# Patient Record
Sex: MALE | Race: WHITE | Employment: FULL TIME | URBAN - METROPOLITAN AREA
[De-identification: names, ages, dates, MRNs, and addresses within clinical notes are randomized per-mention and may not be internally consistent; named-entity substitution may affect disease eponyms.]

---

## 2017-01-10 ENCOUNTER — OFFICE VISIT (OUTPATIENT)
Dept: FAMILY MEDICINE | Facility: CLINIC | Age: 37
End: 2017-01-10
Payer: COMMERCIAL

## 2017-01-10 VITALS
HEART RATE: 72 BPM | WEIGHT: 214.1 LBS | HEIGHT: 67 IN | DIASTOLIC BLOOD PRESSURE: 70 MMHG | BODY MASS INDEX: 33.6 KG/M2 | SYSTOLIC BLOOD PRESSURE: 112 MMHG

## 2017-01-10 DIAGNOSIS — M79.18 MYOFASCIAL PAIN ON LEFT SIDE: ICD-10-CM

## 2017-01-10 DIAGNOSIS — R53.83 OTHER FATIGUE: ICD-10-CM

## 2017-01-10 DIAGNOSIS — F51.01 PRIMARY INSOMNIA: ICD-10-CM

## 2017-01-10 DIAGNOSIS — Z13.6 CARDIOVASCULAR SCREENING; LDL GOAL LESS THAN 160: ICD-10-CM

## 2017-01-10 DIAGNOSIS — Z00.00 ROUTINE GENERAL MEDICAL EXAMINATION AT A HEALTH CARE FACILITY: Primary | ICD-10-CM

## 2017-01-10 LAB
ANION GAP SERPL CALCULATED.3IONS-SCNC: 9 MMOL/L (ref 3–14)
BASOPHILS # BLD AUTO: 0 10E9/L (ref 0–0.2)
BASOPHILS NFR BLD AUTO: 0.3 %
BUN SERPL-MCNC: 17 MG/DL (ref 7–30)
CALCIUM SERPL-MCNC: 8.3 MG/DL (ref 8.5–10.1)
CHLORIDE SERPL-SCNC: 104 MMOL/L (ref 94–109)
CHOLEST SERPL-MCNC: 204 MG/DL
CO2 SERPL-SCNC: 25 MMOL/L (ref 20–32)
CREAT SERPL-MCNC: 0.96 MG/DL (ref 0.66–1.25)
DIFFERENTIAL METHOD BLD: NORMAL
EOSINOPHIL # BLD AUTO: 0.3 10E9/L (ref 0–0.7)
EOSINOPHIL NFR BLD AUTO: 4 %
ERYTHROCYTE [DISTWIDTH] IN BLOOD BY AUTOMATED COUNT: 13.4 % (ref 10–15)
GFR SERPL CREATININE-BSD FRML MDRD: 89 ML/MIN/1.7M2
GLUCOSE SERPL-MCNC: 94 MG/DL (ref 70–99)
HCT VFR BLD AUTO: 44.1 % (ref 40–53)
HDLC SERPL-MCNC: 48 MG/DL
HGB BLD-MCNC: 15.2 G/DL (ref 13.3–17.7)
LDLC SERPL CALC-MCNC: 140 MG/DL
LYMPHOCYTES # BLD AUTO: 1.7 10E9/L (ref 0.8–5.3)
LYMPHOCYTES NFR BLD AUTO: 25.8 %
MCH RBC QN AUTO: 29.5 PG (ref 26.5–33)
MCHC RBC AUTO-ENTMCNC: 34.5 G/DL (ref 31.5–36.5)
MCV RBC AUTO: 86 FL (ref 78–100)
MONOCYTES # BLD AUTO: 0.6 10E9/L (ref 0–1.3)
MONOCYTES NFR BLD AUTO: 9.6 %
NEUTROPHILS # BLD AUTO: 4 10E9/L (ref 1.6–8.3)
NEUTROPHILS NFR BLD AUTO: 60.3 %
NONHDLC SERPL-MCNC: 156 MG/DL
PLATELET # BLD AUTO: 227 10E9/L (ref 150–450)
POTASSIUM SERPL-SCNC: 3.6 MMOL/L (ref 3.4–5.3)
RBC # BLD AUTO: 5.16 10E12/L (ref 4.4–5.9)
SODIUM SERPL-SCNC: 138 MMOL/L (ref 133–144)
TRIGL SERPL-MCNC: 78 MG/DL
TSH SERPL DL<=0.005 MIU/L-ACNC: 1.85 MU/L (ref 0.4–4)
WBC # BLD AUTO: 6.6 10E9/L (ref 4–11)

## 2017-01-10 PROCEDURE — 36415 COLL VENOUS BLD VENIPUNCTURE: CPT | Performed by: FAMILY MEDICINE

## 2017-01-10 PROCEDURE — 84443 ASSAY THYROID STIM HORMONE: CPT | Performed by: FAMILY MEDICINE

## 2017-01-10 PROCEDURE — 85025 COMPLETE CBC W/AUTO DIFF WBC: CPT | Performed by: FAMILY MEDICINE

## 2017-01-10 PROCEDURE — 80061 LIPID PANEL: CPT | Performed by: FAMILY MEDICINE

## 2017-01-10 PROCEDURE — 80048 BASIC METABOLIC PNL TOTAL CA: CPT | Performed by: FAMILY MEDICINE

## 2017-01-10 PROCEDURE — 99213 OFFICE O/P EST LOW 20 MIN: CPT | Mod: 25 | Performed by: FAMILY MEDICINE

## 2017-01-10 PROCEDURE — 99395 PREV VISIT EST AGE 18-39: CPT | Performed by: FAMILY MEDICINE

## 2017-01-10 RX ORDER — TRAZODONE HYDROCHLORIDE 50 MG/1
50 TABLET, FILM COATED ORAL AT BEDTIME
Qty: 30 TABLET | Refills: 5 | Status: SHIPPED | OUTPATIENT
Start: 2017-01-10 | End: 2017-06-06

## 2017-01-10 NOTE — MR AVS SNAPSHOT
After Visit Summary   1/10/2017    Duane Rm    MRN: [id number]           Patient Information     Date Of Birth          1980        Visit Information        Provider Department      1/10/2017 9:20 AM TAMMI Vogel MD [facility]        Today's Diagnoses     CARDIOVASCULAR SCREENING; LDL GOAL LESS THAN 160    -  1     Other fatigue         Primary insomnia           Care Instructions    Try capsaicin cream, applied sparingly to the sore area left upper back using rubber gloves up to 4 times a day.  If the trazodone makes you too groggy the next day, take 1/2 a pill.        Preventive Health Recommendations  Male Ages 26 - 39    Yearly exam:             See your health care provider every year in order to  o   Review health changes.   o   Discuss preventive care.    o   Review your medicines if your doctor has prescribed any.    You should be tested each year for STDs (sexually transmitted diseases), if you re at risk.     After age 35, talk to your provider about cholesterol testing. If you are at risk for heart disease, have your cholesterol tested at least every 5 years.     If you are at risk for diabetes, you should have a diabetes test (fasting glucose).  Shots: Get a flu shot each year. Get a tetanus shot every 10 years.     Nutrition:    Eat at least 5 servings of fruits and vegetables daily.     Eat whole-grain bread, whole-wheat pasta and brown rice instead of white grains and rice.     Talk to your provider about Calcium and Vitamin D.     Lifestyle    Exercise for at least 150 minutes a week (30 minutes a day, 5 days a week). This will help you control your weight and prevent disease.     Limit alcohol to one drink per day.     No smoking.     Wear sunscreen to prevent skin cancer.     See your dentist every six months for an exam and cleaning.             Follow-ups after your visit        Who to contact     If you have questions or need follow up information about  "today's clinic visit or your schedule please contact [facility] directly at [phone].  Normal or non-critical lab and imaging results will be communicated to you by [employer] Nowhart, letter or phone within 4 business days after the clinic has received the results. If you do not hear from us within 7 days, please contact the clinic through [employer] Nowhart or phone. If you have a critical or abnormal lab result, we will notify you by phone as soon as possible.  Submit refill requests through FaceTags or call your pharmacy and they will forward the refill request to us. Please allow 3 business days for your refill to be completed.          Additional Information About Your Visit        Imm[employer] Nowhart Information     FaceTags lets you send messages to your doctor, view your test results, renew your prescriptions, schedule appointments and more. To sign up, go to [email or web address] . Click on \"Log in\" on the left side of the screen, which will take you to the Welcome page. Then click on \"Sign up Now\" on the right side of the page.     You will be asked to enter the access code listed below, as well as some personal information. Please follow the directions to create your username and password.     Your access code is: [email or web address]  Expires: 4/10/2017  9:58 AM     Your access code will  in 90 days. If you need help or a new code, please call your [place] clinic or [phone].        Care EveryWhere ID     This is your Care EveryWhere ID. This could be used by other organizations to access your [facility] medical records  [id number]        Your Vitals Were     Pulse Height BMI (Body Mass Index)             72 5' 7\" (1.702 m) 33.52 kg/m2          Blood Pressure from Last 3 Encounters:   01/10/17 112/70   12/31/15 120/69   12 128/70    Weight from Last 3 Encounters:   01/10/17 214 lb 1.6 oz (97.115 kg)   12/31/15 216 lb 3.2 oz (98.068 kg)   12 205 lb (92.987 kg)              We Performed the Following  "    Basic metabolic panel     CBC with platelets differential     Lipid panel reflex to direct LDL     TSH with free T4 reflex          Today's Medication Changes          These changes are accurate as of: 1/10/17  9:58 AM.  If you have any questions, ask your nurse or doctor.               Start taking these medicines.        Dose/Directions    traZODone 50 MG tablet   Commonly known as:  DESYREL   Used for:  Primary insomnia        Dose:  50 mg   Take 1 tablet (50 mg) by mouth At Bedtime   Quantity:  30 tablet   Refills:  5            Where to get your medicines      These medications were sent to [facility] [address] [address] [address]     Phone:  [phone]    - traZODone 50 MG tablet             Primary Care Provider Office Phone # Fax #    R Cleve Vogel [phone]       [facility] [address] [place] [address]        Thank you!     Thank you for choosing [facility]  for your care. Our goal is always to provide you with excellent care. Hearing back from our patients is one way we can continue to improve our services. Please take a few minutes to complete the written survey that you may receive in the mail after your visit with us. Thank you!             Your Updated Medication List - Protect others around you: Learn how to safely use, store and throw away your medicines at www.disposemymeds.org.          This list is accurate as of: 1/10/17  9:58 AM.  Always use your most recent med list.                   Brand Name Dispense Instructions for use    NO ACTIVE MEDICATIONS          traZODone 50 MG tablet    DESYREL    30 tablet    Take 1 tablet (50 mg) by mouth At Bedtime

## 2017-01-10 NOTE — PROGRESS NOTES
SUBJECTIVE:     CC: Duane Rm is an 36 year old male who presents for preventative health visit.     Healthy Habits:    Do you get at least three servings of calcium containing foods daily (dairy, green leafy vegetables, etc.)? yes    Amount of exercise or daily activities, outside of work: 3-4 day(s) per week    Problems taking medications regularly not applicable    Medication side effects: No    Have you had an eye exam in the past two years? yes    Do you see a dentist twice per year? no    Do you have sleep apnea, excessive snoring or daytime drowsiness? Yes, pt. States he is always tired      Insomnia: He has been bothered for years with difficulty falling asleep and also waking up after a few hours and having difficulty going back to sleep. He does feel tired and run down during the day. He states that he is not depressed or down the dumps and does not really feel it's anxiety either. Once he does wake up he starts thinking about many things and has difficulty unwinding again.    He also complains of pain in the left shoulder strap region that comes and goes      Today's PHQ-2 Score:   PHQ-2 ( 1999 Pfizer) 1/10/2017   Q1: Little interest or pleasure in doing things 0   Q2: Feeling down, depressed or hopeless 0   PHQ-2 Score 0       Abuse: Current or Past(Physical, Sexual or Emotional)- No  Do you feel safe in your environment - Yes    Social History   Substance Use Topics     Smoking status: Current Some Day Smoker -- 15.0 years     Types: Cigarettes     Smokeless tobacco: Current User     Types: Chew      Comment: has 1-2 per month     Alcohol Use: Yes      Comment: once a week has 8-10 beers     The patient does not drink >3 drinks per day nor >7 drinks per week.    Last PSA: No results found for: PSA    No results for input(s): CHOL, HDL, LDL, TRIG, CHOLHDLRATIO, NHDL in the last 86470 hours.    Reviewed orders with patient. Reviewed health maintenance and updated orders accordingly - Yes    All  "Histories reviewed and updated in Epic.      ROS:  C: NEGATIVE for fever, chills, change in weight  I: NEGATIVE for worrisome rashes, moles or lesions  E: NEGATIVE for vision changes or irritation  ENT: NEGATIVE for ear, mouth and throat problems  R: NEGATIVE for significant cough or SOB  CV: NEGATIVE for chest pain, palpitations or peripheral edema  GI: NEGATIVE for nausea, abdominal pain, heartburn, or change in bowel habits   male: negative for dysuria, hematuria, decreased urinary stream, erectile dysfunction, urethral discharge  M: See above, otherwise negative  N: NEGATIVE for weakness, dizziness or paresthesias  P: NEGATIVE for changes in mood or affect      OBJECTIVE:     /70 mmHg  Pulse 72  Ht 5' 7\" (1.702 m)  Wt 214 lb 1.6 oz (97.115 kg)  BMI 33.52 kg/m2  EXAM:  GENERAL: healthy, alert and no distress  EYES: Eyes grossly normal to inspection, PERRL and conjunctivae and sclerae normal  HENT: ear canals and TM's normal, nose and mouth without ulcers or lesions  NECK: no adenopathy, no asymmetry, masses, or scars and thyroid normal to palpation  RESP: lungs clear to auscultation - no rales, rhonchi or wheezes  CV: regular rate and rhythm, normal S1 S2, no S3 or S4, no murmur, click or rub, no peripheral edema and peripheral pulses strong  ABDOMEN: soft, nontender, no hepatosplenomegaly, no masses and bowel sounds normal  MS: Tenderness to palpation of the left shoulder strap region; the shoulder joint is normal no gross musculoskeletal defects noted, no edema  SKIN: no suspicious lesions or rashes  NEURO: Normal strength and tone, mentation intact and speech normal  PSYCH: mentation appears normal, affect normal/bright    ASSESSMENT/PLAN:       ASSESSMENT:  1. Routine general medical examination at a health care facility    2. Other fatigue    3. Primary insomnia    4. CARDIOVASCULAR SCREENING; LDL GOAL LESS THAN 160    5. Myofascial pain on left side      Discussed pathophysiology of insomnia and " implications.  Questions answered.  We discussed sleep hygiene with recommendations to get up out of bed and go in another room to read or watch TV until he gets drowsy. Will do some workup for other causes of fatigue other than the sleep disturbance.    PLAN:  Orders Placed This Encounter     OFFICE/OUTPT VISIT,EST,LEVL II     Basic metabolic panel     Lipid panel reflex to direct LDL     TSH with free T4 reflex     CBC with platelets differential     traZODone (DESYREL) 50 MG tablet   Discussed how to take the medication(s), expected outcomes, potential side effects.     Patient Instructions   Try capsaicin cream, applied sparingly to the sore area left upper back using rubber gloves up to 4 times a day.  If the trazodone makes you too groggy the next day, take 1/2 a pill.        Preventive Health Recommendations  Male Ages 26 - 39    Yearly exam:             See your health care provider every year in order to  o   Review health changes.   o   Discuss preventive care.    o   Review your medicines if your doctor has prescribed any.    You should be tested each year for STDs (sexually transmitted diseases), if you re at risk.     After age 35, talk to your provider about cholesterol testing. If you are at risk for heart disease, have your cholesterol tested at least every 5 years.     If you are at risk for diabetes, you should have a diabetes test (fasting glucose).  Shots: Get a flu shot each year. Get a tetanus shot every 10 years.     Nutrition:    Eat at least 5 servings of fruits and vegetables daily.     Eat whole-grain bread, whole-wheat pasta and brown rice instead of white grains and rice.     Talk to your provider about Calcium and Vitamin D.     Lifestyle    Exercise for at least 150 minutes a week (30 minutes a day, 5 days a week). This will help you control your weight and prevent disease.     Limit alcohol to one drink per day.     No smoking.     Wear sunscreen to prevent skin cancer.     See your  "dentist every six months for an exam and cleaning.            COUNSELING:  Reviewed preventive health counseling, as reflected in patient instructions       Regular exercise       Healthy diet/nutrition       Vision screening       Hearing screening         reports that he has been smoking Cigarettes.  He has smoked for the past 15.0 years. His smokeless tobacco use includes Chew.  Tobacco Cessation Action Plan: Information offered: Patient not interested at this time  Estimated body mass index is 33.52 kg/(m^2) as calculated from the following:    Height as of this encounter: 5' 7\" (1.702 m).    Weight as of this encounter: 214 lb 1.6 oz (97.115 kg).   Weight management plan: Discussed healthy diet and exercise guidelines and patient will follow up in 12 months in clinic to re-evaluate.    Counseling Resources:  ATP IV Guidelines  Pooled Cohorts Equation Calculator  FRAX Risk Assessment  ICSI Preventive Guidelines  Dietary Guidelines for Americans, 2010  USDA's MyPlate  ASA Prophylaxis  Lung CA Screening    TAMMI Vogel MD  [facility]  "

## 2017-01-10 NOTE — Clinical Note
[facility]  [address] [facility] [place] [address]  Phone: [phone]    January 11, 2017    Duane Rm  [address] [address] [address]          Dear Duane,    The results of your recent lab tests were within normal limits. There is nothing unusual in your lab tests that would explain the fatigue that you have. Their cholesterol levels were a little higher than ideal, so I would recommend trying to cut down on the fat in your diet, increasing aerobic exercise, and losing some weight. Recheck your cholesterol levels in one year. Enclosed is a copy of these results.  If you have any further questions or problems, please contact our office.  Results for orders placed or performed in visit on 01/10/17   Basic metabolic panel   Result Value Ref Range    Sodium 138 133 - 144 mmol/L    Potassium 3.6 3.4 - 5.3 mmol/L    Chloride 104 94 - 109 mmol/L    Carbon Dioxide 25 20 - 32 mmol/L    Anion Gap 9 3 - 14 mmol/L    Glucose 94 70 - 99 mg/dL    Urea Nitrogen 17 7 - 30 mg/dL    Creatinine 0.96 0.66 - 1.25 mg/dL    GFR Estimate 89 >60 mL/min/1.7m2    GFR Estimate If Black >90   GFR Calc   >60 mL/min/1.7m2    Calcium 8.3 (L) 8.5 - 10.1 mg/dL   Lipid panel reflex to direct LDL   Result Value Ref Range    Cholesterol 204 (H) <200 mg/dL    Triglycerides 78 <150 mg/dL    HDL Cholesterol 48 >39 mg/dL    LDL Cholesterol Calculated 140 (H) <100 mg/dL    Non HDL Cholesterol 156 (H) <130 mg/dL   TSH with free T4 reflex   Result Value Ref Range    TSH 1.85 0.40 - 4.00 mU/L   CBC with platelets differential   Result Value Ref Range    WBC 6.6 4.0 - 11.0 10e9/L    RBC Count 5.16 4.4 - 5.9 10e12/L    Hemoglobin 15.2 13.3 - 17.7 g/dL    Hematocrit 44.1 40.0 - 53.0 %    MCV 86 78 - 100 fl    MCH 29.5 26.5 - 33.0 pg    MCHC 34.5 31.5 - 36.5 g/dL    RDW 13.4 10.0 - 15.0 %    Platelet Count 227 150 - 450 10e9/L    Diff Method Automated Method     % Neutrophils 60.3 %    % Lymphocytes  25.8 %    % Monocytes 9.6 %    % Eosinophils 4.0 %    % Basophils 0.3 %    Absolute Neutrophil 4.0 1.6 - 8.3 10e9/L    Absolute Lymphocytes 1.7 0.8 - 5.3 10e9/L    Absolute Monocytes 0.6 0.0 - 1.3 10e9/L    Absolute Eosinophils 0.3 0.0 - 0.7 10e9/L    Absolute Basophils 0.0 0.0 - 0.2 10e9/L     Sincerely,      ELIJAH Vogel MD/ llc

## 2017-01-10 NOTE — NURSING NOTE
"Chief Complaint   Patient presents with     Physical     pt. is fasting for labwork       Initial /70 mmHg  Pulse 72  Ht 5' 7\" (1.702 m)  Wt 214 lb 1.6 oz (97.115 kg)  BMI 33.52 kg/m2 Estimated body mass index is 33.52 kg/(m^2) as calculated from the following:    Height as of this encounter: 5' 7\" (1.702 m).    Weight as of this encounter: 214 lb 1.6 oz (97.115 kg).  BP completed using cuff size: marvin Regan, JUANI      "

## 2017-01-10 NOTE — PROGRESS NOTES
Quick Note:    Please SEND LETTER    Notify patient of acceptable results. There is nothing unusual in your lab tests that would explain the fatigue that you have. Their cholesterol levels were a little higher than ideal, so I would recommend trying to cut down on the fat in your diet, increasing aerobic exercise, and losing some weight. Recheck your cholesterol levels in one year        ______

## 2017-01-10 NOTE — PATIENT INSTRUCTIONS
Try capsaicin cream, applied sparingly to the sore area left upper back using rubber gloves up to 4 times a day.  If the trazodone makes you too groggy the next day, take 1/2 a pill.        Preventive Health Recommendations  Male Ages 26 - 39    Yearly exam:             See your health care provider every year in order to  o   Review health changes.   o   Discuss preventive care.    o   Review your medicines if your doctor has prescribed any.    You should be tested each year for STDs (sexually transmitted diseases), if you re at risk.     After age 35, talk to your provider about cholesterol testing. If you are at risk for heart disease, have your cholesterol tested at least every 5 years.     If you are at risk for diabetes, you should have a diabetes test (fasting glucose).  Shots: Get a flu shot each year. Get a tetanus shot every 10 years.     Nutrition:    Eat at least 5 servings of fruits and vegetables daily.     Eat whole-grain bread, whole-wheat pasta and brown rice instead of white grains and rice.     Talk to your provider about Calcium and Vitamin D.     Lifestyle    Exercise for at least 150 minutes a week (30 minutes a day, 5 days a week). This will help you control your weight and prevent disease.     Limit alcohol to one drink per day.     No smoking.     Wear sunscreen to prevent skin cancer.     See your dentist every six months for an exam and cleaning.

## 2017-06-06 ENCOUNTER — OFFICE VISIT (OUTPATIENT)
Dept: FAMILY MEDICINE | Facility: CLINIC | Age: 37
End: 2017-06-06
Payer: COMMERCIAL

## 2017-06-06 VITALS
HEIGHT: 68 IN | BODY MASS INDEX: 31.01 KG/M2 | TEMPERATURE: 98.2 F | DIASTOLIC BLOOD PRESSURE: 76 MMHG | WEIGHT: 204.6 LBS | SYSTOLIC BLOOD PRESSURE: 123 MMHG | HEART RATE: 90 BPM

## 2017-06-06 DIAGNOSIS — L02.92 CARBUNCLE AND FURUNCLE: Primary | ICD-10-CM

## 2017-06-06 DIAGNOSIS — L02.93 CARBUNCLE AND FURUNCLE: Primary | ICD-10-CM

## 2017-06-06 DIAGNOSIS — L03.115 CELLULITIS OF RIGHT LOWER EXTREMITY: ICD-10-CM

## 2017-06-06 LAB
GRAM STN SPEC: ABNORMAL
Lab: ABNORMAL
MICRO REPORT STATUS: ABNORMAL
SPECIMEN SOURCE: ABNORMAL

## 2017-06-06 PROCEDURE — 87186 SC STD MICRODIL/AGAR DIL: CPT | Performed by: FAMILY MEDICINE

## 2017-06-06 PROCEDURE — 87070 CULTURE OTHR SPECIMN AEROBIC: CPT | Performed by: FAMILY MEDICINE

## 2017-06-06 PROCEDURE — 99214 OFFICE O/P EST MOD 30 MIN: CPT | Performed by: FAMILY MEDICINE

## 2017-06-06 PROCEDURE — 87077 CULTURE AEROBIC IDENTIFY: CPT | Performed by: FAMILY MEDICINE

## 2017-06-06 PROCEDURE — 87205 SMEAR GRAM STAIN: CPT | Performed by: FAMILY MEDICINE

## 2017-06-06 RX ORDER — CLINDAMYCIN HCL 300 MG
300 CAPSULE ORAL 4 TIMES DAILY
Qty: 40 CAPSULE | Refills: 0 | Status: SHIPPED | OUTPATIENT
Start: 2017-06-06 | End: 2017-06-16

## 2017-06-06 NOTE — NURSING NOTE
"Chief Complaint   Patient presents with     Derm Problem     Pt has 3 different cysts he would like checked.       Initial /76  Pulse 90  Temp 98.2  F (36.8  C) (Tympanic)  Ht 5' 7.5\" (1.715 m)  Wt 204 lb 9.6 oz (92.8 kg)  BMI 31.57 kg/m2 Estimated body mass index is 31.57 kg/(m^2) as calculated from the following:    Height as of this encounter: 5' 7.5\" (1.715 m).    Weight as of this encounter: 204 lb 9.6 oz (92.8 kg).  Medication Reconciliation: complete  Emmy Lubin CMA    "

## 2017-06-06 NOTE — MR AVS SNAPSHOT
"              After Visit Summary   6/6/2017    Duane Rm    MRN: [id number]           Patient Information     Date Of Birth          1980        Visit Information        Provider Department      6/6/2017 7:20 AM Colton Jay MD [facility]        Today's Diagnoses     Carbuncle and furuncle    -  1    Cellulitis of right lower extremity          Care Instructions    Start Clindamycin as prescribed.  TAke this medication with food.  Wound culture of the discharge will be sent to the lab.  Depending on result, will either continue current antibiotic or change to a more appropriate one.  Warm compress to the infected areas 10 mins at a time, 2-3 x a day.    If you have fever, chills, rapidly expanding swelling/redness or severe pain, see provider promptly.    Otherwise, follow up in clinic for recheck on Thursday, June 8, 2017.          Follow-ups after your visit        Who to contact     If you have questions or need follow up information about today's clinic visit or your schedule please contact [facility] directly at [phone].  Normal or non-critical lab and imaging results will be communicated to you by MyChart, letter or phone within 4 business days after the clinic has received the results. If you do not hear from us within 7 days, please contact the clinic through Poikoshart or phone. If you have a critical or abnormal lab result, we will notify you by phone as soon as possible.  Submit refill requests through Robodrom or call your pharmacy and they will forward the refill request to us. Please allow 3 business days for your refill to be completed.          Additional Information About Your Visit        MyChart Information     Robodrom lets you send messages to your doctor, view your test results, renew your prescriptions, schedule appointments and more. To sign up, go to [email or web address] . Click on \"Log in\" on the left side of the screen, which will take " "you to the Welcome page. Then click on \"Sign up Now\" on the right side of the page.     You will be asked to enter the access code listed below, as well as some personal information. Please follow the directions to create your username and password.     Your access code is: [email or web address]  Expires: 2017  8:11 AM     Your access code will  in 90 days. If you need help or a new code, please call your [place] clinic or [phone].        Care EveryWhere ID     This is your Care EveryWhere ID. This could be used by other organizations to access your [place] medical records  [id number]        Your Vitals Were     Pulse Temperature Height BMI (Body Mass Index)          90 98.2  F (36.8  C) (Tympanic) 5' 7.5\" (1.715 m) 31.57 kg/m2         Blood Pressure from Last 3 Encounters:   17 123/76   01/10/17 112/70   12/31/15 120/69    Weight from Last 3 Encounters:   17 204 lb 9.6 oz (92.8 kg)   01/10/17 214 lb 1.6 oz (97.1 kg)   12/31/15 216 lb 3.2 oz (98.1 kg)              We Performed the Following     Gram stain     Wound Culture Aerobic Bacterial          Today's Medication Changes          These changes are accurate as of: 17  8:11 AM.  If you have any questions, ask your nurse or doctor.               Start taking these medicines.        Dose/Directions    clindamycin 300 MG capsule   Commonly known as:  CLEOCIN   Used for:  Carbuncle and furuncle, Cellulitis of right lower extremity   Started by:  Colton Jay MD        Dose:  300 mg   Take 1 capsule (300 mg) by mouth 4 times daily for 10 days   Quantity:  40 capsule   Refills:  0            Where to get your medicines      These medications were sent to [facility] [place], [place] - [address]  [address] [address]     Phone:  [phone]     clindamycin 300 MG capsule                Primary Care Provider Office Phone # Fax #    R Cleve Vogel [phone]       [facility]" [address] [address]        Thank you!     Thank you for choosing [facility]  for your care. Our goal is always to provide you with excellent care. Hearing back from our patients is one way we can continue to improve our services. Please take a few minutes to complete the written survey that you may receive in the mail after your visit with us. Thank you!             Your Updated Medication List - Protect others around you: Learn how to safely use, store and throw away your medicines at www.disposemymeds.org.          This list is accurate as of: 6/6/17  8:11 AM.  Always use your most recent med list.                   Brand Name Dispense Instructions for use    clindamycin 300 MG capsule    CLEOCIN    40 capsule    Take 1 capsule (300 mg) by mouth 4 times daily for 10 days       NO ACTIVE MEDICATIONS

## 2017-06-06 NOTE — PATIENT INSTRUCTIONS
Start Clindamycin as prescribed.  TAke this medication with food.  Wound culture of the discharge will be sent to the lab.  Depending on result, will either continue current antibiotic or change to a more appropriate one.  Warm compress to the infected areas 10 mins at a time, 2-3 x a day.    If you have fever, chills, rapidly expanding swelling/redness or severe pain, see provider promptly.    Otherwise, follow up in clinic for recheck on Thursday, June 8, 2017.
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
## 2017-06-08 ENCOUNTER — OFFICE VISIT (OUTPATIENT)
Dept: FAMILY MEDICINE | Facility: CLINIC | Age: 37
End: 2017-06-08
Payer: COMMERCIAL

## 2017-06-08 VITALS
TEMPERATURE: 97.9 F | HEART RATE: 84 BPM | SYSTOLIC BLOOD PRESSURE: 112 MMHG | WEIGHT: 204 LBS | DIASTOLIC BLOOD PRESSURE: 58 MMHG | BODY MASS INDEX: 30.92 KG/M2 | HEIGHT: 68 IN

## 2017-06-08 DIAGNOSIS — L03.90 MRSA CELLULITIS: Primary | ICD-10-CM

## 2017-06-08 DIAGNOSIS — B95.62 MRSA CELLULITIS: Primary | ICD-10-CM

## 2017-06-08 LAB
BACTERIA SPEC CULT: ABNORMAL
Lab: ABNORMAL
MICRO REPORT STATUS: ABNORMAL
MICROORGANISM SPEC CULT: ABNORMAL
SPECIMEN SOURCE: ABNORMAL

## 2017-06-08 PROCEDURE — 99213 OFFICE O/P EST LOW 20 MIN: CPT | Performed by: FAMILY MEDICINE

## 2017-06-08 NOTE — MR AVS SNAPSHOT
"              After Visit Summary   6/8/2017    Duane Rm    MRN: [id number]           Patient Information     Date Of Birth          1980        Visit Information        Provider Department      6/8/2017 7:00 AM Colton Jay MD [facility]        Today's Diagnoses     MRSA cellulitis    -  1      Care Instructions    Skin infection appears to be responding well to clindamycin. Continue this medication as prescribed.  Follow up in clinic to recheck the open sore on the buttock.    Use Hibiclens cleanser liberally from head to toe (avoid eyes and mouth), focus specially on the wounds, leave on for 15 seconds then rinse thoroughly.        Staph Infection (MRSA)  \"Staph\" is the short name for the common bacteria called \"staphylococcus aureus\". Staph bacteria are often present on the skin without causing an infection. If it gets under the skin an infection occurs. This causes redness, tenderness, swelling and sometimes fluid drainage.  MRSA stands for \"Methicillin-Resistant Staph Aureus\". Unlike a common staph infection, MRSA bacteria are resistant to the usual antibiotics and harder to treat. Also, MRSA is more toxic than common staph bacteria. It can spread quickly throughout the body and cause a life-threatening illness.  MRSA is spread to others by direct physical contact with the bacteria. MRSA can also be transmitted from items contaminated by a person who has the bacteria, such as bandages, towels, bed sheets, or sports equipment. It is generally not spread through the air.  But you can acquire it if you come in direct contact with the fluid from someone's cough or sneeze.  Once you have a MRSA skin infection, you are at risk of having it again in the future.  If MRSA infection is suspected, the healthcare provider may take a wound culture to confirm the diagnosis. If an abscess is present, it may be drained. One or more antibiotics that work against MRSA will likely be " prescribed.  Home care    Take any antibiotics prescribed exactly as directed. Do not stop taking them until they are gone or your healthcare provider tells you to stop, even if you feel better.    If antibiotic ointment was prescribed, use it as directed.    Wash your whole body (scalp to toes) daily for 5 days with prescription soap. Scrub fingernails with a brush for 1 minute twice a day with prescription soap.    Keep wounds covered with clean, dry bandages. Change dressings as they become soiled. Wash your hands well each time you change the bandage or touch the wound.    Remove any artificial nails and nail polish.  Treating household members  If you have been diagnosed with possible MRSA infection, those living with you are at higher risk of carrying the bacteria on their skin or in their nose, even if there is no sign of infection. Bacteria must be removed from the skin of all household members at the same time so it is not passed back and forth. Advise them to remove the bacteria as follows:    Household member should wash with prescription soap as outlined above.    If anyone in the household has a skin infection, it must be treated by a healthcare provider. Washing alone will not treat a MRSA infection.    Clean counter tops and children's toys.    Do not share personal items such as toothbrush and razors. It is okay to share glasses, plates, and utensils.  Preventing spread of infection    Wash your hands frequently with plain soap and warm water. Be certain to clean under the fingernails, between the fingers, and the wrists. Dry hands with a single use towel (for example a paper towel). If soap and water are not available, you can use an alcohol-based hand . Rub the  over the entire surface of the hands, fingers, and wrists until dry.    Avoid sharing personal items such as towels, washcloths, razors, clothing, or uniforms. Wash soiled sheets, towels or clothes in hot water with  laundry detergent. Use an automatic clothes dryer set on high to kill any remaining bacteria.    If you use a gym, wipe down equipment with an alcohol-based  before and after each use.  Wipe the handgrips as well.    If you participate in sports, shower with plain soap after every activity. Use a clean towel for each shower.  Follow-up care  Follow-up with your healthcare provider or as advised by our staff. If a wound culture was taken, call as directed for the results. You will be told about any changes to your treatment.  If you are diagnosed with MRSA, tell medical personnel in the future that you have been treated for this type of infection.  When to seek medical advice  Call your healthcare provider if any of the following occur:    Increasing redness, swelling or pain    Red streaks in the skin around the wound    Weakness or dizziness    New appearance of pus or drainage from the wound    New fever over 100.4  F (38.0  C), or as directed by the healthcare provider    7209-9985 [employer]. [address] [place] [address]. All rights reserved. This information is not intended as a substitute for professional medical care. Always follow your healthcare professional's instructions.                Follow-ups after your visit        Follow-up notes from your care team     Return if symptoms worsen or fail to improve.      Who to contact     If you have questions or need follow up information about today's clinic visit or your schedule please contact [facility] directly at [phone].  Normal or non-critical lab and imaging results will be communicated to you by MyChart, letter or phone within 4 business days after the clinic has received the results. If you do not hear from us within 7 days, please contact the clinic through MyChart or phone. If you have a critical or abnormal lab result, we will notify you by phone as soon as possible.  Submit refill requests  "through [email or web address] or call your pharmacy and they will forward the refill request to us. Please allow 3 business days for your refill to be completed.          Additional Information About Your Visit        MyChart Information     [email or web address] lets you send messages to your doctor, view your test results, renew your prescriptions, schedule appointments and more. To sign up, go to [email or web address] . Click on \"Log in\" on the left side of the screen, which will take you to the Welcome page. Then click on \"Sign up Now\" on the right side of the page.     You will be asked to enter the access code listed below, as well as some personal information. Please follow the directions to create your username and password.     Your access code is: [email or web address]  Expires: 2017  8:11 AM     Your access code will  in 90 days. If you need help or a new code, please call your [facility] clinic or [phone].        Care EveryWhere ID     This is your Care EveryWhere ID. This could be used by other organizations to access your [facility] medical records  [id number]        Your Vitals Were     Pulse Temperature Height BMI (Body Mass Index)          84 97.9  F (36.6  C) (Tympanic) 5' 7.5\" (1.715 m) 31.48 kg/m2         Blood Pressure from Last 3 Encounters:   17 112/58   17 123/76   01/10/17 112/70    Weight from Last 3 Encounters:   17 204 lb (92.5 kg)   17 204 lb 9.6 oz (92.8 kg)   01/10/17 214 lb 1.6 oz (97.1 kg)              Today, you had the following     No orders found for display       Primary Care Provider Office Phone # Fax #    TAMMI Vogel [phone]       [address] [address]        Thank you!     Thank you for choosing [facility]  for your care. Our goal is always to provide you with excellent care. Hearing back from our patients is one way we can continue to improve our services. Please take a few minutes to " complete the written survey that you may receive in the mail after your visit with us. Thank you!             Your Updated Medication List - Protect others around you: Learn how to safely use, store and throw away your medicines at www.disposemymeds.org.          This list is accurate as of: 6/8/17  7:26 AM.  Always use your most recent med list.                   Brand Name Dispense Instructions for use    clindamycin 300 MG capsule    CLEOCIN    40 capsule    Take 1 capsule (300 mg) by mouth 4 times daily for 10 days       NO ACTIVE MEDICATIONS

## 2017-06-08 NOTE — PROGRESS NOTES
SUBJECTIVE:                                                    Duane Rm is a 36 year old male who presents to clinic today for the following health issues:  Chief Complaint   Patient presents with     Derm Problem     Pt here for recheck on cysts from a couple days ago.         Pt has no new concerns.  Taking clindamycin as directed.  Sores are improving  Wound culture showed MRSA susceptible to clindamycin.  Patient denies fever, chills, new skin lesions or increasing pain.  Patient states the right leg and left buttock lesions have shown less redness and less pain.      Problem list and histories reviewed & adjusted, as indicated.  Additional history: as documented    Patient Active Problem List   Diagnosis     CARDIOVASCULAR SCREENING; LDL GOAL LESS THAN 160     Past Surgical History:   Procedure Laterality Date     VASECTOMY  3/25/11    No scalpel technique       Social History   Substance Use Topics     Smoking status: Former Smoker     Years: 15.00     Types: Cigarettes     Quit date: 4/22/2017     Smokeless tobacco: Current User     Types: Chew      Comment: has 1-2 per month     Alcohol use Yes      Comment: once a week has 8-10 beers     History reviewed. No pertinent family history.      Current Outpatient Prescriptions   Medication Sig Dispense Refill     clindamycin (CLEOCIN) 300 MG capsule Take 1 capsule (300 mg) by mouth 4 times daily for 10 days 40 capsule 0     NO ACTIVE MEDICATIONS        Allergies   Allergen Reactions     Nkda [No Known Drug Allergies]        Reviewed and updated as needed this visit by clinical staff  Tobacco  Allergies  Meds  Problems  Med Hx  Surg Hx  Fam Hx  Soc Hx        Reviewed and updated as needed this visit by Provider  Allergies  Meds  Problems         ROS:  C: NEGATIVE for fever, chills, change in weight  INTEGUMENTARY/SKIN: see above  MUSCULOSKELETAL: see above  H: NEGATIVE for bleeding problems    OBJECTIVE:                                               "      /58  Pulse 84  Temp 97.9  F (36.6  C) (Tympanic)  Ht 5' 7.5\" (1.715 m)  Wt 204 lb (92.5 kg)  BMI 31.48 kg/m2  Body mass index is 31.48 kg/(m^2).  GEN: alert, oriented x 3, NAD  SKIN: indurated areas on the right lower leg, left gluteal area and left upper chest showed less erythema compared to 2 days ago with less tenderness as well; left gluteal area has 1 cm round non-draining ulcer (pt states he picked off a scab from it last night); no new lesions    Diagnostic test results:  Diagnostic Test Results:  Results for orders placed or performed in visit on 06/06/17   Wound Culture Aerobic Bacterial   Result Value Ref Range    Specimen Description Right Leg LOWER Wound     Special Requests Specimen collected in eSwab transport (white cap)     Culture Micro (A)      Moderate growth Methicillin resistant Staphylococcus aureus (MRSA) This isolate   DOES NOT demonstrate inducible clindamycin resistance in vitro. Clindamycin is   susceptible and could be used when indicated, however, erythromycin is   resistant and should not be used.      Micro Report Status FINAL 06/08/2017     Organism:       Moderate growth Methicillin resistant Staphylococcus aureus (MRSA) This isolate   DOES NOT demonstrate inducible clindamycin resistance in vitro. Clindamycin is   susceptible and could be used when indicated, however, erythromycin is   resistant and should not be used.         Susceptibility    Moderate growth methicillin resistant staphylococcus aureus (mrsa) this isolate  does not demonstrate inducible clindamycin resistance in vitro. clindamycin is  susceptible and could be used when hai -  (no method available)     CIPROFLOXACIN >=8 Resistant  ug/mL     CLINDAMYCIN <=0.25 Susceptible  ug/mL     ERYTHROMYCIN >=8 Resistant  ug/mL     GENTAMICIN <=0.5 Susceptible  ug/mL     LEVOFLOXACIN 4 Resistant  ug/mL     OXACILLIN >=4 Resistant  ug/mL     PENICILLIN >=0.5 Resistant  ug/mL     TETRACYCLINE <=1 Susceptible  " "ug/mL     Trimethoprim/Sulfa <=.5/9.5 Susceptible  ug/mL     VANCOMYCIN 1 Susceptible  ug/mL     LINEZOLID 2 Susceptible  ug/mL   Gram stain   Result Value Ref Range    Specimen Description Right Leg LOWER Wound     Special Requests Specimen collected in eSwab transport (white cap)     Gram Stain Rare Gram positive cocci  Few PMNs seen   (A)     Micro Report Status FINAL 06/06/2017         ASSESSMENT/PLAN:                                                        ICD-10-CM    1. MRSA cellulitis L03.90     B95.62      Continue Clindamycin.  No fluctuance to drain today.  Hibiclens wash discussed with patient.  Discussed with patient nature, course and treatment of MRSA infections.  Recheck 1 week to reassess gluteal ulcer.  Return precautions discussed and given to patient.      Follow up with Provider - prn   Patient Instructions   Skin infection appears to be responding well to clindamycin. Continue this medication as prescribed.  Follow up in clinic to recheck the open sore on the buttock.    Use Hibiclens cleanser liberally from head to toe (avoid eyes and mouth), focus specially on the wounds, leave on for 15 seconds then rinse thoroughly.        Staph Infection (MRSA)  \"Staph\" is the short name for the common bacteria called \"staphylococcus aureus\". Staph bacteria are often present on the skin without causing an infection. If it gets under the skin an infection occurs. This causes redness, tenderness, swelling and sometimes fluid drainage.  MRSA stands for \"Methicillin-Resistant Staph Aureus\". Unlike a common staph infection, MRSA bacteria are resistant to the usual antibiotics and harder to treat. Also, MRSA is more toxic than common staph bacteria. It can spread quickly throughout the body and cause a life-threatening illness.  MRSA is spread to others by direct physical contact with the bacteria. MRSA can also be transmitted from items contaminated by a person who has the bacteria, such as bandages, towels, bed " sheets, or sports equipment. It is generally not spread through the air.  But you can acquire it if you come in direct contact with the fluid from someone's cough or sneeze.  Once you have a MRSA skin infection, you are at risk of having it again in the future.  If MRSA infection is suspected, the healthcare provider may take a wound culture to confirm the diagnosis. If an abscess is present, it may be drained. One or more antibiotics that work against MRSA will likely be prescribed.  Home care    Take any antibiotics prescribed exactly as directed. Do not stop taking them until they are gone or your healthcare provider tells you to stop, even if you feel better.    If antibiotic ointment was prescribed, use it as directed.    Wash your whole body (scalp to toes) daily for 5 days with prescription soap. Scrub fingernails with a brush for 1 minute twice a day with prescription soap.    Keep wounds covered with clean, dry bandages. Change dressings as they become soiled. Wash your hands well each time you change the bandage or touch the wound.    Remove any artificial nails and nail polish.  Treating household members  If you have been diagnosed with possible MRSA infection, those living with you are at higher risk of carrying the bacteria on their skin or in their nose, even if there is no sign of infection. Bacteria must be removed from the skin of all household members at the same time so it is not passed back and forth. Advise them to remove the bacteria as follows:    Household member should wash with prescription soap as outlined above.    If anyone in the household has a skin infection, it must be treated by a healthcare provider. Washing alone will not treat a MRSA infection.    Clean counter tops and children's toys.    Do not share personal items such as toothbrush and razors. It is okay to share glasses, plates, and utensils.  Preventing spread of infection    Wash your hands frequently with plain soap and  warm water. Be certain to clean under the fingernails, between the fingers, and the wrists. Dry hands with a single use towel (for example a paper towel). If soap and water are not available, you can use an alcohol-based hand . Rub the  over the entire surface of the hands, fingers, and wrists until dry.    Avoid sharing personal items such as towels, washcloths, razors, clothing, or uniforms. Wash soiled sheets, towels or clothes in hot water with laundry detergent. Use an automatic clothes dryer set on high to kill any remaining bacteria.    If you use a gym, wipe down equipment with an alcohol-based  before and after each use.  Wipe the handgrips as well.    If you participate in sports, shower with plain soap after every activity. Use a clean towel for each shower.  Follow-up care  Follow-up with your healthcare provider or as advised by our staff. If a wound culture was taken, call as directed for the results. You will be told about any changes to your treatment.  If you are diagnosed with MRSA, tell medical personnel in the future that you have been treated for this type of infection.  When to seek medical advice  Call your healthcare provider if any of the following occur:    Increasing redness, swelling or pain    Red streaks in the skin around the wound    Weakness or dizziness    New appearance of pus or drainage from the wound    New fever over 100.4  F (38.0  C), or as directed by the healthcare provider    5180-6110 [employer]. [address] [place] [address], [place] [id number]. All rights reserved. This information is not intended as a substitute for professional medical care. Always follow your healthcare professional's instructions.            Colton Jay MD  [facility]

## 2017-06-08 NOTE — NURSING NOTE
"Chief Complaint   Patient presents with     Derm Problem     Pt here for recheck on cysts from a couple days ago.       Initial /58  Pulse 84  Temp 97.9  F (36.6  C) (Tympanic)  Ht 5' 7.5\" (1.715 m)  Wt 204 lb (92.5 kg)  BMI 31.48 kg/m2 Estimated body mass index is 31.48 kg/(m^2) as calculated from the following:    Height as of this encounter: 5' 7.5\" (1.715 m).    Weight as of this encounter: 204 lb (92.5 kg).  Medication Reconciliation: complete  Emmy Lubin CMA    "

## 2017-06-08 NOTE — PATIENT INSTRUCTIONS
"Skin infection appears to be responding well to clindamycin. Continue this medication as prescribed.  Follow up in clinic to recheck the open sore on the buttock.    Use Hibiclens cleanser liberally from head to toe (avoid eyes and mouth), focus specially on the wounds, leave on for 15 seconds then rinse thoroughly.        Staph Infection (MRSA)  \"Staph\" is the short name for the common bacteria called \"staphylococcus aureus\". Staph bacteria are often present on the skin without causing an infection. If it gets under the skin an infection occurs. This causes redness, tenderness, swelling and sometimes fluid drainage.  MRSA stands for \"Methicillin-Resistant Staph Aureus\". Unlike a common staph infection, MRSA bacteria are resistant to the usual antibiotics and harder to treat. Also, MRSA is more toxic than common staph bacteria. It can spread quickly throughout the body and cause a life-threatening illness.  MRSA is spread to others by direct physical contact with the bacteria. MRSA can also be transmitted from items contaminated by a person who has the bacteria, such as bandages, towels, bed sheets, or sports equipment. It is generally not spread through the air.  But you can acquire it if you come in direct contact with the fluid from someone's cough or sneeze.  Once you have a MRSA skin infection, you are at risk of having it again in the future.  If MRSA infection is suspected, the healthcare provider may take a wound culture to confirm the diagnosis. If an abscess is present, it may be drained. One or more antibiotics that work against MRSA will likely be prescribed.  Home care    Take any antibiotics prescribed exactly as directed. Do not stop taking them until they are gone or your healthcare provider tells you to stop, even if you feel better.    If antibiotic ointment was prescribed, use it as directed.    Wash your whole body (scalp to toes) daily for 5 days with prescription soap. Scrub fingernails with a " brush for 1 minute twice a day with prescription soap.    Keep wounds covered with clean, dry bandages. Change dressings as they become soiled. Wash your hands well each time you change the bandage or touch the wound.    Remove any artificial nails and nail polish.  Treating household members  If you have been diagnosed with possible MRSA infection, those living with you are at higher risk of carrying the bacteria on their skin or in their nose, even if there is no sign of infection. Bacteria must be removed from the skin of all household members at the same time so it is not passed back and forth. Advise them to remove the bacteria as follows:    Household member should wash with prescription soap as outlined above.    If anyone in the household has a skin infection, it must be treated by a healthcare provider. Washing alone will not treat a MRSA infection.    Clean counter tops and children's toys.    Do not share personal items such as toothbrush and razors. It is okay to share glasses, plates, and utensils.  Preventing spread of infection    Wash your hands frequently with plain soap and warm water. Be certain to clean under the fingernails, between the fingers, and the wrists. Dry hands with a single use towel (for example a paper towel). If soap and water are not available, you can use an alcohol-based hand . Rub the  over the entire surface of the hands, fingers, and wrists until dry.    Avoid sharing personal items such as towels, washcloths, razors, clothing, or uniforms. Wash soiled sheets, towels or clothes in hot water with laundry detergent. Use an automatic clothes dryer set on high to kill any remaining bacteria.    If you use a gym, wipe down equipment with an alcohol-based  before and after each use.  Wipe the handgrips as well.    If you participate in sports, shower with plain soap after every activity. Use a clean towel for each shower.  Follow-up care  Follow-up with  your healthcare provider or as advised by our staff. If a wound culture was taken, call as directed for the results. You will be told about any changes to your treatment.  If you are diagnosed with MRSA, tell medical personnel in the future that you have been treated for this type of infection.  When to seek medical advice  Call your healthcare provider if any of the following occur:    Increasing redness, swelling or pain    Red streaks in the skin around the wound    Weakness or dizziness    New appearance of pus or drainage from the wound    New fever over 100.4  F (38.0  C), or as directed by the healthcare provider    4084-3207 [employer]. [address] [place] [address]. All rights reserved. This information is not intended as a substitute for professional medical care. Always follow your healthcare professional's instructions.

## 2017-07-10 ENCOUNTER — TELEPHONE (OUTPATIENT)
Dept: FAMILY MEDICINE | Facility: CLINIC | Age: 37
End: 2017-07-10

## 2017-07-10 DIAGNOSIS — L03.115 CELLULITIS OF RIGHT LOWER EXTREMITY: ICD-10-CM

## 2017-07-10 DIAGNOSIS — L02.93 CARBUNCLE AND FURUNCLE: ICD-10-CM

## 2017-07-10 DIAGNOSIS — L02.92 CARBUNCLE AND FURUNCLE: ICD-10-CM

## 2017-07-10 NOTE — TELEPHONE ENCOUNTER
Hx of MRSA, finished ABX, wound healed, no open wounds, hard bumps-?infection.  Appt tomorrow AM.  Lilia

## 2017-07-10 NOTE — TELEPHONE ENCOUNTER
Reason for call:  Patient reporting a symptom    Symptom or request: Pt continues to have cellulitis symptoms on his leg and he would like an Rx [facility] for an antibiotic [facility]    Duration (how long have symptoms been present): ongoing    Have you been treated for this before? No    Additional comments:     Phone Number patient can be reached at:  Home number on file [phone] (home)    Best Time:  any    Can we leave a detailed message on this number:  YES    Call taken on 7/10/2017 at 8:06 AM by Sandrine Bonds

## 2017-07-11 ENCOUNTER — OFFICE VISIT (OUTPATIENT)
Dept: FAMILY MEDICINE | Facility: CLINIC | Age: 37
End: 2017-07-11
Payer: COMMERCIAL

## 2017-07-11 VITALS
DIASTOLIC BLOOD PRESSURE: 76 MMHG | SYSTOLIC BLOOD PRESSURE: 115 MMHG | WEIGHT: 208 LBS | HEART RATE: 78 BPM | TEMPERATURE: 97.8 F | RESPIRATION RATE: 18 BRPM | BODY MASS INDEX: 32.1 KG/M2

## 2017-07-11 DIAGNOSIS — L02.91 ABSCESS: Primary | ICD-10-CM

## 2017-07-11 DIAGNOSIS — A49.02 MRSA INFECTION: ICD-10-CM

## 2017-07-11 PROCEDURE — 99214 OFFICE O/P EST MOD 30 MIN: CPT | Performed by: FAMILY MEDICINE

## 2017-07-11 RX ORDER — CLINDAMYCIN HCL 300 MG
300 CAPSULE ORAL 4 TIMES DAILY
Qty: 56 CAPSULE | Refills: 1 | Status: SHIPPED | OUTPATIENT
Start: 2017-07-11

## 2017-07-11 NOTE — MR AVS SNAPSHOT
After Visit Summary   7/11/2017    Duane Rm    MRN: [id number]           Patient Information     Date Of Birth          1980        Visit Information        Provider Department      7/11/2017 7:00 AM Giovani Ndiaye MD [facility]        Today's Diagnoses     Abscess    -  1    MRSA infection          Care Instructions          Thank you for choosing [facility].  You may be receiving a survey in the mail from [facility] regarding your visit today.  Please take a few minutes to complete and return the survey to let us know how we are doing.      Our Clinic hours are:  Mondays    7:20 am - 7 pm  Tues -  Fri  7:20 am - 5 pm    Clinic Phone: [phone]    The clinic lab opens at 7:30 am Mon - Fri and appointments are required.    [facility] [place]  Ph. [phone]  Monday-Thursday 8 am - 7pm  Tues/Wed/Fri 8 am - 5:30 pm                 Follow-ups after your visit        Additional Services     INFECTIOUS DISEASE REFERRAL       Your provider has referred you to: [facility]: Adult Specialty [facility] Infusion [facility] [phone]   [email or web address]    Please be aware that coverage of these services is subject to the terms and limitations of your health insurance plan.  Call member services at your health plan with any benefit or coverage questions.      Please bring the following with you to your appointment:    (1) Any X-Rays, CTs or MRIs which have been performed.  Contact the facility where they were done to arrange for  prior to your scheduled appointment.    (2) List of current medications   (3) This referral request   (4) Any documents/labs given to you for this referral                  Who to contact     If you have questions or need follow up information about today's clinic visit or your schedule please contact [facility] directly at [phone].  Normal or  "non-critical lab and imaging results will be communicated to you by MyChart, letter or phone within 4 business days after the clinic has received the results. If you do not hear from us within 7 days, please contact the clinic through [employer] or phone. If you have a critical or abnormal lab result, we will notify you by phone as soon as possible.  Submit refill requests through [employer] or call your pharmacy and they will forward the refill request to us. Please allow 3 business days for your refill to be completed.          Additional Information About Your Visit        [employer] Information     [employer] lets you send messages to your doctor, view your test results, renew your prescriptions, schedule appointments and more. To sign up, go to [email or web address] . Click on \"Log in\" on the left side of the screen, which will take you to the Welcome page. Then click on \"Sign up Now\" on the right side of the page.     You will be asked to enter the access code listed below, as well as some personal information. Please follow the directions to create your username and password.     Your access code is: [email or web address]  Expires: 2017  8:11 AM     Your access code will  in 90 days. If you need help or a new code, please call your [place] clinic or [phone].        Care EveryWhere ID     This is your Care EveryWhere ID. This could be used by other organizations to access your [place] medical records  [id number]        Your Vitals Were     Pulse Temperature Respirations BMI (Body Mass Index)          78 97.8  F (36.6  C) 18 32.1 kg/m2         Blood Pressure from Last 3 Encounters:   17 115/76   17 112/58   17 123/76    Weight from Last 3 Encounters:   17 208 lb (94.3 kg)   17 204 lb (92.5 kg)   17 204 lb 9.6 oz (92.8 kg)              We Performed the Following     INFECTIOUS DISEASE REFERRAL          Today's Medication Changes          These changes are accurate as of: " 7/11/17  7:26 AM.  If you have any questions, ask your nurse or doctor.               Start taking these medicines.        Dose/Directions    clindamycin 300 MG capsule   Commonly known as:  CLEOCIN   Used for:  Abscess   Started by:  Giovani Ndiaye MD        Dose:  300 mg   Take 1 capsule (300 mg) by mouth 4 times daily   Quantity:  56 capsule   Refills:  1            Where to get your medicines      These medications were sent to [place] [facility] - [id number]  [address], [place] [address]    Hours:  JER[facility] [place] [facility] Phone:  [phone]     clindamycin 300 MG capsule                Primary Care Provider Office Phone # Fax #    R Cleve Vogel [phone]       [address] [address]        Equal Access to Services     [facility]: Hadii sammy zendejas hadasho Soomaali, waaxda luqadaha, qaybta kaalmada adeegyada, suki huttonin hayeligio rendon . So [facility] [phone].    ATENCIÓN: Si habla español, tiene a engel disposición servicios gratuitos de asistencia lingüística. Llame al [phone].    We comply with applicable federal civil rights laws and [place] laws. We do not discriminate on the basis of race, color, national origin, age, disability sex, sexual orientation or gender identity.            Thank you!     Thank you for choosing [facility]  for your care. Our goal is always to provide you with excellent care. Hearing back from our patients is one way we can continue to improve our services. Please take a few minutes to complete the written survey that you may receive in the mail after your visit with us. Thank you!             Your Updated Medication List - Protect others around you: Learn how to safely use, store and throw away your medicines at www.disposemymeds.org.          This list is accurate as of: 7/11/17  7:26 AM.  Always use your most recent med list.                    Brand Name Dispense Instructions for use Diagnosis    clindamycin 300 MG capsule    CLEOCIN    56 capsule    Take 1 capsule (300 mg) by mouth 4 times daily    Abscess       NO ACTIVE MEDICATIONS

## 2017-07-11 NOTE — PROGRESS NOTES
SUBJECTIVE:                                                    Duane Rm is a 36 year old male who presents to clinic today for the following health issues:    Chief Complaint   Patient presents with     Wound Check     recheck infection one on his right buttock and left groin area .                 Problem list and histories reviewed & adjusted, as indicated.  Additional history: as documented        Reviewed and updated as needed this visit by clinical staff  Tobacco  Allergies  Meds       Reviewed and updated as needed this visit by Provider      Further history obtained, clarified or corrected by physician:  He has developed a couple new red tender areas. He was on clindamycin for MRSA infections that cleared up with 10 days of treatment but prior to completion he had 2 new small follicles that have now developed into larger abscess areas. He is otherwise feeling well.    OBJECTIVE:  LUNGS: clear to auscultation, normal breath sounds  CV: RRR without murmur  ABD: BS+, soft, nontender, no masses, no hepatosplenomegaly  EXTREMITIES: without joint tenderness, swelling or erythema.  No muscle tenderness or abnormality.  SKIN: No rashes or abnormalities, except the right buttocks and the left groin/scrotum area there are small abscesses approximately 2 cm in diameter without fluctuance but with mild redness and tenderness.  NEURO:non focal exam    ASSESSMENT:     Abscess  MRSA infection    PLAN:  Get him back on the clindamycin  Hot pack  Return if there is not improvement in the next 5-7 days  Infectious disease consultation because of the apparent persistence of infections.    Orders Placed This Encounter     INFECTIOUS DISEASE REFERRAL     clindamycin (CLEOCIN) 300 MG capsule

## 2017-07-11 NOTE — PATIENT INSTRUCTIONS
Thank you for choosing [facility].  You may be receiving a survey in the mail from [facility] regarding your visit today.  Please take a few minutes to complete and return the survey to let us know how we are doing.      Our Clinic hours are:  Mondays    7:20 am - 7 pm  Tues -  Fri  7:20 am - 5 pm    Clinic Phone: [phone]    The clinic lab opens at 7:30 am Mon - Fri and appointments are required.    [facility]. [phone]  Monday-Thursday 8 am - 7pm  Tues/Wed/Fri 8 am - 5:30 pm

## 2017-07-11 NOTE — NURSING NOTE
"Chief Complaint   Patient presents with     Wound Check     recheck infection one on his right buttock and left groin area .       Initial /76  Pulse 78  Temp 97.8  F (36.6  C)  Resp 18  Wt 208 lb (94.3 kg)  BMI 32.1 kg/m2 Estimated body mass index is 32.1 kg/(m^2) as calculated from the following:    Height as of 6/8/17: 5' 7.5\" (1.715 m).    Weight as of this encounter: 208 lb (94.3 kg).  Medication Reconciliation: complete   Ethel Cleveland CMA      "

## 2017-07-12 RX ORDER — CLINDAMYCIN HCL 300 MG
CAPSULE ORAL
Qty: 40 CAPSULE | Refills: 0 | OUTPATIENT
Start: 2017-07-12

## 2022-09-27 ENCOUNTER — APPOINTMENT (OUTPATIENT)
Dept: OCCUPATIONAL MEDICINE | Facility: CLINIC | Age: 42
End: 2022-09-27

## 2022-09-27 PROCEDURE — 99000 SPECIMEN HANDLING OFFICE-LAB: CPT | Performed by: FAMILY MEDICINE
